# Patient Record
Sex: MALE | Race: WHITE
[De-identification: names, ages, dates, MRNs, and addresses within clinical notes are randomized per-mention and may not be internally consistent; named-entity substitution may affect disease eponyms.]

---

## 2019-10-15 ENCOUNTER — HOSPITAL ENCOUNTER (EMERGENCY)
Dept: HOSPITAL 74 - JER | Age: 24
LOS: 1 days | Discharge: HOME | End: 2019-10-16
Payer: COMMERCIAL

## 2019-10-15 VITALS — BODY MASS INDEX: 25.1 KG/M2

## 2019-10-15 DIAGNOSIS — K52.9: Primary | ICD-10-CM

## 2019-10-15 LAB
ALBUMIN SERPL-MCNC: 3.9 G/DL (ref 3.4–5)
ALP SERPL-CCNC: 84 U/L (ref 45–117)
ALT SERPL-CCNC: 24 U/L (ref 13–61)
ANION GAP SERPL CALC-SCNC: 6 MMOL/L (ref 8–16)
ARTERIAL BLOOD GAS PCO2: 31.2 MMHG (ref 35–45)
ARTERIAL PATENCY WRIST A: POSITIVE
AST SERPL-CCNC: 20 U/L (ref 15–37)
BASE EXCESS BLDA CALC-SCNC: 0.6 MEQ/L (ref -2–2)
BASOPHILS # BLD: 0.6 % (ref 0–2)
BILIRUB SERPL-MCNC: 0.6 MG/DL (ref 0.2–1)
BUN SERPL-MCNC: 13.5 MG/DL (ref 7–18)
CALCIUM SERPL-MCNC: 8.5 MG/DL (ref 8.5–10.1)
CHLORIDE SERPL-SCNC: 103 MMOL/L (ref 98–107)
CO2 SERPL-SCNC: 27 MMOL/L (ref 21–32)
CREAT SERPL-MCNC: 1.1 MG/DL (ref 0.55–1.3)
DEPRECATED RDW RBC AUTO: 13.5 % (ref 11.9–15.9)
EOSINOPHIL # BLD: 0 % (ref 0–4.5)
GLUCOSE SERPL-MCNC: 105 MG/DL (ref 74–106)
HCT VFR BLD CALC: 43 % (ref 35.4–49)
HGB BLD-MCNC: 14.6 GM/DL (ref 11.7–16.9)
LIPASE SERPL-CCNC: 85 U/L (ref 73–393)
LYMPHOCYTES # BLD: 10.2 % (ref 8–40)
MAGNESIUM SERPL-MCNC: 1.5 MG/DL (ref 1.8–2.4)
MCH RBC QN AUTO: 28.9 PG (ref 25.7–33.7)
MCHC RBC AUTO-ENTMCNC: 34 G/DL (ref 32–35.9)
MCV RBC: 85.2 FL (ref 80–96)
MONOCYTES # BLD AUTO: 6.2 % (ref 3.8–10.2)
NEUTROPHILS # BLD: 83 % (ref 42.8–82.8)
PLATELET # BLD AUTO: 190 K/MM3 (ref 134–434)
PMV BLD: 9.3 FL (ref 7.5–11.1)
PO2 BLDA: 90.4 MMHG (ref 80–100)
POTASSIUM SERPLBLD-SCNC: 3.4 MMOL/L (ref 3.5–5.1)
PROT SERPL-MCNC: 6.9 G/DL (ref 6.4–8.2)
RBC # BLD AUTO: 5.04 M/MM3 (ref 4–5.6)
SAO2 % BLDA: 97.6 % (ref 95–98)
SODIUM SERPL-SCNC: 136 MMOL/L (ref 136–145)
WBC # BLD AUTO: 12 K/MM3 (ref 4–10)

## 2019-10-15 PROCEDURE — 3E033GC INTRODUCTION OF OTHER THERAPEUTIC SUBSTANCE INTO PERIPHERAL VEIN, PERCUTANEOUS APPROACH: ICD-10-PCS

## 2019-10-15 PROCEDURE — 3E0337Z INTRODUCTION OF ELECTROLYTIC AND WATER BALANCE SUBSTANCE INTO PERIPHERAL VEIN, PERCUTANEOUS APPROACH: ICD-10-PCS

## 2019-10-15 PROCEDURE — 3E033NZ INTRODUCTION OF ANALGESICS, HYPNOTICS, SEDATIVES INTO PERIPHERAL VEIN, PERCUTANEOUS APPROACH: ICD-10-PCS

## 2019-10-15 NOTE — PDOC
History of Present Illness





- General


Chief Complaint: Pain


Stated Complaint: SENT BY DOCTOR


History Source: Patient


Exam Limitations: No Limitations





- History of Present Illness


Travel History: No


Timing/Duration: reports: intermittent


Quality: reports: moderate, cramping


Abdominal Pain Onset Location: reports: periumbilical


Pain Radiation: reports: no radiation


Activities at Onset: reports: none (He has had nausea vomiting and diarrhea all 

day.  He states he has vomited and has had at least 10 loose bowel movements.  

He also has complaint of fever and chills)


Alleviating Factors: improves with: None





Past History





- Travel


Traveled outside of the country in the last 30 days: No





- Past Medical History


Allergies/Adverse Reactions: 


 Allergies











Allergy/AdvReac Type Severity Reaction Status Date / Time


 


No Known Allergies Allergy   Verified 10/15/19 21:56











Home Medications: 


Ambulatory Orders





Ibuprofen [Motrin -] 800 mg PO Q6H #30 tablet 07/02/16 


Ondansetron [Zofran *Odt*] 4 mg SL TID PRN #14 od.tablet 10/16/19 








COPD: No





- Immunization History


Immunization Up to Date: Yes





- Psycho Social/Smoking Cessation Hx


Smoking Status: No


Smoking History: Never smoked


Number of Cigarettes Smoked Daily: 0


Hx Alcohol Use: Yes (SOCIAL)


Drug/Substance Use Hx: No


Substance Use Type: None





**Review of Systems





- Review of Systems


Able to Perform ROS?: Yes


Is the patient limited English proficient: No


Constitutional: Yes: Chills, Fever, Malaise


HEENTM: No: Symptoms Reported, See HPI, Eye Pain, Blurred Vision, Tearing, 

Recent change in vision, Double Vision, Cataracts, Ear Pain, Ocular Prothesis, 

Ear Discharge, Nose Pain, Nose Congestion, Tinnitus, Nose Bleeding, Hearing Loss

, Throat Pain, Throat Swelling, Mouth Pain, Dental Problems, Difficulty 

Swallowing, Mouth Swelling, Other


Respiratory: No: Symptoms reported, See HPI, Cough, Orthopnea, Shortness of 

Breath, SOB with Exertion, SOB at Rest, Stridor, Wheezing, Productive cough, 

Hemoptysis, Other


Cardiac (ROS): No: Symptoms Reported, See HPI, Chest Pain, Edema, Irregular 

Heart Rate, Lightheadedness, Palpitations, Syncope, Chest Tightness, Other


ABD/GI: Yes: Diarrhea, Nausea, Vomiting, Abdominal cramping


: No: Symptoms Reported, See HPI, Burning, Dysuria, Discharge, Frequency, 

Flank Pain, Hematuria, Incontinence, Pain, Urgency, Testicular Mass, Testicular 

Swelling, Lesions, Testicular Pain, Other


Musculoskeletal: No: Symptoms Reported, See HPI, Back Pain, Gout, Joint Pain, 

Joint Swelling, Muscle Pain, Muscle Weakness, Neck Pain, Joint Stiffness, Other


Integumentary: No: Symptoms Reported, See HPI, Bruising, Change in Color, 

Change in Hair/Nails, Dryness, Erythema, Flushing, Lesions, Lumps, Pallor, 

Pruritus, Rash, Sweating, Other


Neurological: No: Symptoms reported, See HPI, Headache, Numbness, Paresthesia, 

Pre-Existing Deficit, Seizure, Tingling, Tremors, Weakness, Unsteady Gait, 

Ataxia, Dizziness, Other


Psychiatric: No: Anxiety, Depression, Frequent Crying, Stressors, Sleep Pattern 

Change, Emotional Problems, Mood Swings, Change in Appetite, Other


Hematologic/Lymphatic: No: Symptoms Reported, See HPI, Anemia, Blood Clots, 

Easy Bleeding, Easy Bruising, Bleeding Diathesis, Lymph Node Abnormalities, 

Swollen Glands, Other





*Physical Exam





- Vital Signs


 Last Vital Signs











Temp Pulse Resp BP Pulse Ox


 


 102.9 F H  100 H  24 H  117/80   99 


 


 10/15/19 21:56  10/15/19 21:56  10/15/19 21:56  10/15/19 21:56  10/15/19 21:56














- Physical Exam


General Appearance: Yes: Mild Distress


HEENT: positive: Normal Voice


Neck: positive: Supple


Respiratory/Chest: positive: Lungs Clear


Cardiovascular: positive: Tachycardia


Gastrointestinal/Abdominal: positive: Tenderness


Musculoskeletal: positive: Normal Inspection


Extremity: positive: Normal Capillary Refill, Normal Inspection, Normal Range 

of Motion


Integumentary: positive: Normal Color, Warm, Diaphoresis


Neurologic: positive: Fully Oriented, Alert, Motor Strength 5/5





ED Treatment Course





- LABORATORY


CBC & Chemistry Diagram: 


 10/15/19 22:30





 10/15/19 22:30





Medical Decision Making





- Medical Decision Making





10/15/19 22:35


24-year-old male presents with 1 day of fever chills nausea vomiting diarrhea.


He denies any recent travel or sick contacts.


Past medical history none


Medications none


Patient presented  with abdominal cramping and has diffuse abdominal tenderness 

but no rebound or guarding , multiple episodes of vomiting and diarrhea.  The 

diarrhea is nonbloody.





Concern for colitis, appendicitis, gastroenteritis





Patient received IV fluids, antiemetics, CBC, chemistry, IV Tylenol and reassess








10/16/19 02:13


This pt is feeling much better. He has no focality to his abdominal exam. He 

has no  RLQ  or LLQ pain





pt is able to urinate,no further vomiting or diarrhea








Discussed with pt and his partner to return if he develops focal pain or any 

worsening symptoms


10/16/19 02:24





10/16/19 02:32


pt is 23 yo  who had 1 day of NVD and fever





he is feeling much better after IV fluids and zofran


IMP  gastroenteritis





RX  zofran sent to his pharmacy





Discharge





- Discharge Information


Problems reviewed: Yes


Clinical Impression/Diagnosis: 


 Nausea vomiting and diarrhea





Condition: Good


Disposition: HOME





- Admission


No





- Additional Discharge Information


Prescriptions: 


Ondansetron [Zofran *Odt*] 4 mg SL TID PRN #14 od.tablet


 PRN Reason: Nausea And/Or Vomiting





- Follow up/Referral





- Patient Discharge Instructions


Patient Printed Discharge Instructions:  DI for Vomiting -- Adult, DI for 

Diarrhea and Traveler's Diarrhea -- Adult


Additional Instructions: 


PLEASE  YOUR MEDICATIONS AT YOUR  PHARMACY


REST


TAKE ZOFRAN FOR NAUSEA OR VOMITING


TRY TO KEEP HYDRATED WITH FLUIDS,POPSICLES,SOUPS,ETC


TAKE TYLENOL FOR FEVER,MUSCLE ACHES


RETURN FOR ANY WORSENING PAIN OR PERSISTENT FEVERS





- Post Discharge Activity

## 2019-10-16 VITALS — HEART RATE: 68 BPM | TEMPERATURE: 99 F | SYSTOLIC BLOOD PRESSURE: 126 MMHG | DIASTOLIC BLOOD PRESSURE: 72 MMHG

## 2019-10-16 LAB
APPEARANCE UR: CLEAR
BILIRUB UR STRIP.AUTO-MCNC: NEGATIVE MG/DL
COLOR UR: YELLOW
INR BLD: 1.43 (ref 0.83–1.09)
KETONES UR QL STRIP: (no result)
LEUKOCYTE ESTERASE UR QL STRIP.AUTO: NEGATIVE
NITRITE UR QL STRIP: NEGATIVE
PH UR: 6 [PH] (ref 5–8)
PROT UR QL STRIP: NEGATIVE
PROT UR QL STRIP: NEGATIVE
PT PNL PPP: 16.9 SEC (ref 9.7–13)
SP GR UR: 1.01 (ref 1.01–1.03)
UROBILINOGEN UR STRIP-MCNC: 0.2 MG/DL (ref 0.2–1)

## 2019-10-16 NOTE — EKG
Test Reason : 

Blood Pressure : ***/*** mmHG

Vent. Rate : 076 BPM     Atrial Rate : 076 BPM

   P-R Int : 174 ms          QRS Dur : 096 ms

    QT Int : 344 ms       P-R-T Axes : [phone] degrees

   QTc Int : 387 ms

 

NORMAL SINUS RHYTHM

NONSPECIFIC T WAVE ABNORMALITY

ABNORMAL ECG

NO PREVIOUS ECGS AVAILABLE

Confirmed by JAVIER RODRIGUEZ, PARADISE ([id number]) on 10/16/2019 11:01:31 AM

 

Referred By:             Confirmed By:PARADISE HERRERA MD

## 2019-12-17 ENCOUNTER — HOSPITAL ENCOUNTER (EMERGENCY)
Dept: HOSPITAL 74 - JERFT | Age: 24
Discharge: HOME | End: 2019-12-17
Payer: COMMERCIAL

## 2019-12-17 VITALS — SYSTOLIC BLOOD PRESSURE: 106 MMHG | HEART RATE: 74 BPM | TEMPERATURE: 97.9 F | DIASTOLIC BLOOD PRESSURE: 79 MMHG

## 2019-12-17 VITALS — BODY MASS INDEX: 23.7 KG/M2
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
